# Patient Record
Sex: MALE | Race: WHITE | Employment: STUDENT | URBAN - METROPOLITAN AREA
[De-identification: names, ages, dates, MRNs, and addresses within clinical notes are randomized per-mention and may not be internally consistent; named-entity substitution may affect disease eponyms.]

---

## 2020-12-15 ENCOUNTER — OFFICE VISIT (OUTPATIENT)
Dept: PRIMARY CARE CLINIC | Age: 20
End: 2020-12-15
Payer: COMMERCIAL

## 2020-12-15 VITALS
TEMPERATURE: 97.8 F | OXYGEN SATURATION: 97 % | HEART RATE: 78 BPM | WEIGHT: 151.2 LBS | DIASTOLIC BLOOD PRESSURE: 72 MMHG | HEIGHT: 72 IN | RESPIRATION RATE: 16 BRPM | BODY MASS INDEX: 20.48 KG/M2 | SYSTOLIC BLOOD PRESSURE: 113 MMHG

## 2020-12-15 DIAGNOSIS — F41.9 ANXIETY: ICD-10-CM

## 2020-12-15 DIAGNOSIS — E55.9 VITAMIN D DEFICIENCY: ICD-10-CM

## 2020-12-15 DIAGNOSIS — F41.0 PANIC ATTACKS: ICD-10-CM

## 2020-12-15 DIAGNOSIS — Z00.00 ANNUAL PHYSICAL EXAM: Primary | ICD-10-CM

## 2020-12-15 DIAGNOSIS — H61.23 BILATERAL IMPACTED CERUMEN: ICD-10-CM

## 2020-12-15 PROCEDURE — 99395 PREV VISIT EST AGE 18-39: CPT | Performed by: INTERNAL MEDICINE

## 2020-12-15 RX ORDER — IBUPROFEN 200 MG
TABLET ORAL
COMMUNITY

## 2020-12-15 NOTE — PROGRESS NOTES
Jarred Presley is a 23 y.o.  male and presents with     Chief Complaint   Patient presents with   [facility] Establish Care     Patient presents to establish care. Patients states he injured his lower right back previously and is in moderate pain. Patient rates pain as a 2. Patient is here to establish care. He came to the clinic on insistence of his mother. Patient reports that he has not seen provider in a few years. He has not had a physical.  He does go to [employer] [facility] [employer] and is doing chemistry major. He reports that many years ago he used to do figure skating. He apparently fell backwards and hit his back. He saw orthopedics who told him that it affected his right sacroiliac joint. He gets pain in that region occasionally. But he is able to abnormal range of motion. He also has some social anxiety. The doctor at [address] [place] [address] prescribed him Zoloft. He was then switched to Lexapro. He took it for few years. Currently he is not taking Lexapro  He feels well. Does not smoke or drink alcohol. No past medical history on file. No past surgical history on file. Current Outpatient Medications   Medication Sig    ibuprofen (MOTRIN) 200 mg tablet Take  by mouth.  carbamide peroxide (DEBROX) 6.5 % otic solution Administer 5 Drops into each ear two (2) times a day. No current facility-administered medications for this visit. Health Maintenance   Topic Date Due    HPV Age 9Y-34Y (3 - Male 2-dose series) 09/12/2010    Flu Vaccine (1) 09/01/2020    DTaP/Tdap/Td series (1 - Tdap) 09/12/2020    Pneumococcal 0-64 years  Aged Out       There is no immunization history on file for this patient. No LMP for male patient. Allergies and Intolerances:   No Known Allergies    Family History:   Family History   Problem Relation Age of Onset    No Known Problems Mother     Asthma Father     Hypertension Father        Social History:   He  reports that he has never smoked.  He has never used smokeless tobacco.  He  reports previous alcohol use. Review of Systems:   General: negative for - chills, fatigue, fever, weight change  Psych: negative for - anxiety, depression, irritability or mood swings  ENT: negative for - headaches, hearing change, nasal congestion, oral lesions, sneezing or sore throat  Heme/ Lymph: negative for - bleeding problems, bruising, pallor or swollen lymph nodes  Endo: negative for - hot flashes, polydipsia/polyuria or temperature intolerance  Resp: negative for - cough, shortness of breath or wheezing  CV: negative for - chest pain, edema or palpitations  GI: negative for - abdominal pain, change in bowel habits, constipation, diarrhea or nausea/vomiting  : negative for - dysuria, hematuria, incontinence, pelvic pain or vulvar/vaginal symptoms  MSK: negative for - joint pain, joint swelling or muscle pain  Neuro: negative for - confusion, headaches, seizures or weakness  Derm: negative for - dry skin, hair changes, rash or skin lesion changes          Physical:   Vitals:   Vitals:    12/15/20 1407   BP: 113/72   Pulse: 78   Resp: 16   Temp: 97.8 °F (36.6 °C)   TempSrc: Temporal   SpO2: 97%   Weight: 151 lb 3.2 oz (68.6 kg)   Height: 6' (1.829 m)           Exam:   HEENT- atraumatic,normocephalic, awake, oriented, well nourished, bilateral earwax  Neck - supple,no enlarged lymph nodes, no JVD, no thyromegaly  Chest- CTA, no rhonchi, no crackles  Heart- rrr, no murmurs / gallop/rub  Abdomen- soft,BS+,NT, no hepatosplenomegaly  Ext - no c/c/edema   Neuro- no focal deficits. Power 5/5 all extremities  Skin - warm,dry, no obvious rashes.           Review of Data:   LABS:   Lab Results   Component Value Date/Time    WBC 7.8 12/15/2020 02:52 PM    HGB 16.2 12/15/2020 02:52 PM    HCT 47.0 12/15/2020 02:52 PM    PLATELET 292 00/64/8674 02:52 PM     Lab Results   Component Value Date/Time    Sodium 136 12/15/2020 02:52 PM    Potassium 4.2 12/15/2020 02:52 PM    Chloride 102 12/15/2020 02:52 PM    CO2 29 12/15/2020 02:52 PM    Glucose 95 12/15/2020 02:52 PM    BUN 21 (H) 12/15/2020 02:52 PM    Creatinine 0.94 12/15/2020 02:52 PM     Lab Results   Component Value Date/Time    Cholesterol, total 207 (H) 12/15/2020 02:52 PM    HDL Cholesterol 34 12/15/2020 02:52 PM    LDL, calculated 112.8 (H) 12/15/2020 02:52 PM    Triglyceride 301 (H) 12/15/2020 02:52 PM     No components found for: GPT        Impression / Plan:        ICD-10-CM ICD-9-CM    1. Annual physical exam  Z00.00 V70.0 CBC WITH AUTOMATED DIFF      METABOLIC PANEL, COMPREHENSIVE      LIPID PANEL   2. Vitamin D deficiency  E55.9 268.9 VITAMIN D, 25 HYDROXY   3. Bilateral impacted cerumen  H61.23 380.4 carbamide peroxide (DEBROX) 6.5 % otic solution   4. Anxiety  F41.9 300.00 TSH 3RD GENERATION   5. Panic attacks  F41.0 300.01 TSH 3RD GENERATION       Explained to patient risk benefits of the medications. Advised patient to stop meds if having any side effects. Pt verbalized understanding of the instructions. I have discussed the diagnosis with the patient and the intended plan as seen in the above orders. The patient has received an after-visit summary and questions were answered concerning future plans. I have discussed medication side effects and warnings with the patient as well. I have reviewed the plan of care with the patient, accepted their input and they are in agreement with the treatment goals. Reviewed plan of care. Patient has provided input and agrees with goals. Follow-up and Dispositions    · Return in about 1 year (around 12/15/2021).          Rohti Garcia MD

## 2020-12-15 NOTE — PROGRESS NOTES
Chief Complaint   Patient presents with   Robinson Singleton     Patient presents to establish care. Patients states he injured his lower right back previously and is in moderate pain. Patient rates pain as a 2.

## 2020-12-18 DIAGNOSIS — E55.9 VITAMIN D DEFICIENCY: Primary | ICD-10-CM

## 2020-12-18 RX ORDER — ACETAMINOPHEN 500 MG
2000 TABLET ORAL DAILY
Qty: 90 CAP | Refills: 1 | Status: SHIPPED | OUTPATIENT
Start: 2020-12-18

## 2020-12-21 DIAGNOSIS — R79.89 ELEVATED LFTS: Primary | ICD-10-CM

## 2021-01-05 ENCOUNTER — HOSPITAL ENCOUNTER (OUTPATIENT)
Dept: ULTRASOUND IMAGING | Age: 21
Discharge: HOME | End: 2021-01-05
Attending: INTERNAL MEDICINE
Payer: COMMERCIAL

## 2021-01-05 DIAGNOSIS — R79.89 ELEVATED LFTS: ICD-10-CM

## 2021-01-05 PROCEDURE — 76705 ECHO EXAM OF ABDOMEN: CPT

## 2023-05-14 RX ORDER — IBUPROFEN 200 MG
TABLET ORAL
COMMUNITY